# Patient Record
Sex: FEMALE | Race: WHITE
[De-identification: names, ages, dates, MRNs, and addresses within clinical notes are randomized per-mention and may not be internally consistent; named-entity substitution may affect disease eponyms.]

---

## 2020-04-16 LAB
ALBUMIN SERPL-MCNC: 3.3 G/DL (ref 3.5–5)
ALBUMIN/GLOB SERPL: 0.9 {RATIO} (ref 0.8–2)
ALP SERPL-CCNC: 100 IU/L (ref 40–150)
ALT SERPL-CCNC: 12 IU/L (ref 0–55)
ANION GAP SERPL CALC-SCNC: 9.8 MMOL/L (ref 8–16)
BASOPHILS # BLD AUTO: 0 10*3/UL (ref 0–0.1)
BASOPHILS NFR BLD AUTO: 0.6 % (ref 0–1)
BUN SERPL-MCNC: 20 MG/DL (ref 7–26)
BUN/CREAT SERPL: 23 (ref 6–25)
CALCIUM SERPL-MCNC: 9.2 MG/DL (ref 8.4–10.2)
CHLORIDE SERPL-SCNC: 108 MMOL/L (ref 98–107)
CO2 SERPL-SCNC: 30 MMOL/L (ref 22–29)
DEPRECATED NEUTROPHILS # BLD AUTO: 4 10*3/UL (ref 2.1–6.9)
EGFRCR SERPLBLD CKD-EPI 2021: > 60 ML/MIN (ref 60–?)
EOSINOPHIL # BLD AUTO: 0.5 10*3/UL (ref 0–0.4)
EOSINOPHIL NFR BLD AUTO: 7 % (ref 0–6)
ERYTHROCYTE [DISTWIDTH] IN CORD BLOOD: 15.4 % (ref 11.7–14.4)
GLOBULIN PLAS-MCNC: 3.8 G/DL (ref 2.3–3.5)
GLUCOSE SERPLBLD-MCNC: 113 MG/DL (ref 74–118)
HCT VFR BLD AUTO: 39.4 % (ref 34.2–44.1)
HGB BLD-MCNC: 12.4 G/DL (ref 12–16)
LYMPHOCYTES # BLD: 1.3 10*3/UL (ref 1–3.2)
LYMPHOCYTES NFR BLD AUTO: 20.6 % (ref 18–39.1)
MCH RBC QN AUTO: 29.1 PG (ref 28–32)
MCHC RBC AUTO-ENTMCNC: 31.5 G/DL (ref 31–35)
MCV RBC AUTO: 92.5 FL (ref 81–99)
MONOCYTES # BLD AUTO: 0.6 10*3/UL (ref 0.2–0.8)
MONOCYTES NFR BLD AUTO: 9.3 % (ref 4.4–11.3)
NEUTS SEG NFR BLD AUTO: 62.3 % (ref 38.7–80)
PLATELET # BLD AUTO: 179 X10E3/UL (ref 140–360)
POTASSIUM SERPL-SCNC: 3.8 MMOL/L (ref 3.5–5.1)
RBC # BLD AUTO: 4.26 X10E6/UL (ref 3.6–5.1)
SODIUM SERPL-SCNC: 144 MMOL/L (ref 136–145)

## 2020-04-21 ENCOUNTER — HOSPITAL ENCOUNTER (OUTPATIENT)
Dept: HOSPITAL 88 - CATH LAB | Age: 60
Discharge: HOME | End: 2020-04-21
Attending: INTERNAL MEDICINE
Payer: COMMERCIAL

## 2020-04-21 VITALS — SYSTOLIC BLOOD PRESSURE: 144 MMHG | DIASTOLIC BLOOD PRESSURE: 72 MMHG

## 2020-04-21 VITALS — BODY MASS INDEX: 51.91 KG/M2 | WEIGHT: 293 LBS | HEIGHT: 63 IN

## 2020-04-21 VITALS — DIASTOLIC BLOOD PRESSURE: 71 MMHG | SYSTOLIC BLOOD PRESSURE: 118 MMHG

## 2020-04-21 VITALS — DIASTOLIC BLOOD PRESSURE: 66 MMHG | SYSTOLIC BLOOD PRESSURE: 125 MMHG

## 2020-04-21 VITALS — SYSTOLIC BLOOD PRESSURE: 128 MMHG | DIASTOLIC BLOOD PRESSURE: 86 MMHG

## 2020-04-21 VITALS — DIASTOLIC BLOOD PRESSURE: 64 MMHG | SYSTOLIC BLOOD PRESSURE: 122 MMHG

## 2020-04-21 VITALS — SYSTOLIC BLOOD PRESSURE: 125 MMHG | DIASTOLIC BLOOD PRESSURE: 88 MMHG

## 2020-04-21 VITALS — SYSTOLIC BLOOD PRESSURE: 140 MMHG | DIASTOLIC BLOOD PRESSURE: 70 MMHG

## 2020-04-21 VITALS — SYSTOLIC BLOOD PRESSURE: 113 MMHG | DIASTOLIC BLOOD PRESSURE: 71 MMHG

## 2020-04-21 DIAGNOSIS — L97.929: ICD-10-CM

## 2020-04-21 DIAGNOSIS — I10: ICD-10-CM

## 2020-04-21 DIAGNOSIS — R00.0: ICD-10-CM

## 2020-04-21 DIAGNOSIS — I87.2: ICD-10-CM

## 2020-04-21 DIAGNOSIS — I70.249: Primary | ICD-10-CM

## 2020-04-21 DIAGNOSIS — I48.0: ICD-10-CM

## 2020-04-21 DIAGNOSIS — Z79.02: ICD-10-CM

## 2020-04-21 DIAGNOSIS — Z01.812: ICD-10-CM

## 2020-04-21 PROCEDURE — 36415 COLL VENOUS BLD VENIPUNCTURE: CPT

## 2020-04-21 PROCEDURE — 99153 MOD SED SAME PHYS/QHP EA: CPT

## 2020-04-21 PROCEDURE — 99152 MOD SED SAME PHYS/QHP 5/>YRS: CPT

## 2020-04-21 PROCEDURE — 76937 US GUIDE VASCULAR ACCESS: CPT

## 2020-04-21 PROCEDURE — 80053 COMPREHEN METABOLIC PANEL: CPT

## 2020-04-21 PROCEDURE — 75710 ARTERY X-RAYS ARM/LEG: CPT

## 2020-04-21 PROCEDURE — 75625 CONTRAST EXAM ABDOMINL AORTA: CPT

## 2020-04-21 PROCEDURE — 36247 INS CATH ABD/L-EXT ART 3RD: CPT

## 2020-04-21 PROCEDURE — 85025 COMPLETE CBC W/AUTO DIFF WBC: CPT

## 2020-04-21 NOTE — XMS REPORT
Patient Summary Document

                             Created on: 2020



YEIMY BERNSTEIN

External Reference #: [id number]

: 1960

Sex: Female



Demographics







                          Address                   [address] [place], [place] [address], [place]  [address]

 

                          Home Phone                [phone]

 

                          Preferred Language        Unknown

 

                          Marital Status            Unknown

 

                          Shinto Affiliation     Unknown

 

                          Race                      Unknown

 

                          Ethnic Group              Unknown





Author







                          Author                    [facility]

 

                          Address                   Unknown

 

                          Phone                     Unavailable







Care Team Providers







                    Care Team Member Name    Role                Phone

 

                          Unavailable               Unavailable







Problems

This patient has no known problems.



Allergies, Adverse Reactions, Alerts

This patient has no known allergies or adverse reactions.



Medications

This patient has no known medications.



Encounters







             Start Date/Time    End Date/Time    Encounter Type    Admission Type    Attending [facility] Facility       Care Department     Encounter ID

 

        2019 16:09:04            Outpatient                    [facility]    [id number]

 

        2019 10:35:45            Outpatient                    [facility]    [id number]

 

        2019-09-10 14:01:00    2019-09-10 14:01:00    Outpatient                    [facility]     [id number]

 

        2019 13:44:00    2019 13:44:00    Outpatient                    [facility]     [id number]

 

        2019 08:00:00    2019 08:00:00    Outpatient                    [facility]    [id number]

## 2020-04-21 NOTE — NUR
1409pReceived pt to room #9 ,Identiferx2.bedside report received from GABRIELLA Buckley. Alert 
oriented and appropriate, PERRLA, respirations even and unlabored to room air. Pulses x4 
extremities equal and strong. Pedal pulses PT/DP X2 palpable No access PTx2x2 Dressings 
remain in place. and marked. Cap fill brisk < 3 sec. Rt Tr band approach Diagnostic only. TR 
band ok down at 1500pm and dc home approx 1530pm.No gross issues pain,pallor,pressure or 
dysrhythmia. 



Skin warm and dry integrity appears  D/I. IV 20g to left hand,  presents healthy w/o s/s of 
infiltration or complaint. Abdomen soft and supple. pt offered toileting, denies need to 
urinate or defecate. No personal affects with patient.  Family mother at bedside calling for 
ride. Pt and family verbalizes understanding of POC. 



Currently w/o complaint of pain or need.ds/rn Electrodesiccation Text: The wound bed was treated with electrodesiccation after the biopsy was performed. Notification Instructions: Patient will be notified of biopsy results. However, patient instructed to call the office if not contacted within 2 weeks. Wound Care: Petrolatum Bill For Surgical Tray: no Additional Anesthesia Volume In Cc (Will Not Render If 0): 0 Anesthesia Type: 1% lidocaine with epinephrine Consent: Written consent was obtained from POA and risks were reviewed including but not limited to scarring, infection, bleeding, scabbing, incomplete removal, nerve damage and allergy to anesthesia. Type Of Destruction Used: Curettage Electrodesiccation And Curettage Text: The wound bed was treated with electrodesiccation and curettage after the biopsy was performed. Detail Level: Detailed Dressing: bandage Silver Nitrate Text: The wound bed was treated with silver nitrate after the biopsy was performed. Biopsy Method: Dermablade Hemostasis: Drysol Was A Bandage Applied: Yes Curettage Text: The wound bed was treated with curettage after the biopsy was performed. Billing Type: Third-Party Bill Lab: 6 Anesthesia Volume In Cc (Will Not Render If 0): 0.2 Post-Care Instructions: I reviewed with the patient in detail post-care instructions. Patient is to keep the biopsy site dry overnight, and then apply bacitracin twice daily until healed. Patient may apply hydrogen peroxide soaks to remove any crusting. Cryotherapy Text: The wound bed was treated with cryotherapy after the biopsy was performed. Lab Facility: 3 Biopsy Type: H and E Depth Of Biopsy: dermis

## 2020-04-21 NOTE — NUR
1500p RADIAL Compression removal:     Initial  Cuff  volume    12  cc

            1500p  -2cc Removed  No hematoma/bleeding noted with normal  neurovascular 
function.



            1515p  -5 cc Removed  No hematoma/ bleeding noted with normal  neurovascular 
function.



            1530cc -5cc Removed  No hematoma/bleeding noted with normal  neurovascular 
function.



              c



Air removal completed. 

Stasis achieved sterile 2x2,Tegaderm,  Coban  dressing  No hematoma, bleeding noted with 
normal neurovascular function. Wrist splint in place. Pt instructed on POC.

Ds/Rn

## 2020-04-21 NOTE — NUR
1545p Pt meets DC criteria. Rt Tr band assessed for s/s of complication and presence of 
hematoma. Skin warm, dry, no discolor, and pulses present DP only Po tib unable to access 
due to wraps in place. IV removed from left hand. Distal tip appears intact. VS WNL. Pt 
denies pain, sob, or need at this time. Family at curbside. Review of discharge paperwork 
and follow up instructions. verbalized understanding. Pt to wheelchair and transported to 
front of hospital. Transferred to private vehicle under own strength w/o incident with DC 
paperwork in hand. - ds/rn

## 2020-04-21 NOTE — OPERATIVE REPORT
DATE OF PROCEDURE:  04/21/2020

 

SURGEON:  Hussein Davis MD

 

INDICATION:  Peripheral arterial disease, ulceration and claudication of left lower

extremity with critical limb ischemia, Mott 6. 

 

PROCEDURES PERFORMED:  

1. Ultrasound-guided access in the right radial artery with sheath placement and image

storage. 

2. Conscious sedation and administration.  Hemodynamic and neurological monitoring and

recovery by cath lab RN and supervision by MD for 35 minutes. 

3. Abdominal aorta catheter placement abdominal aortogram.

4. Third order catheter placed in the right radial artery to the left femoral artery

with unilateral extremity angiogram. 

5. Deployment of right wrist TR band.

 

COMPLICATIONS:  None.

 

RECOMMENDATIONS:  Medical therapy assessment for deep and superficial venous disease.

 

DESCRIPTION OF PROCEDURE:  Access obtained in the right radial artery using ultrasound

guidance.  A 6-Danish sheath was placed.  Abdominal aortogram demonstrated widely patent

abdominal aorta and iliacs bilaterally.  Catheter was then extended from the right

radial artery to the left femoral artery.  No evidence of angiographic peripheral

arterial disease.  A 20% to 30% stenosis of the proximal left anterior tibial artery

with three-vessel runoff to the foot.  No 

intervention deemed necessary.  Wire and guide were removed.  TR band applied.  The

patient discharged to home same day. 

 

 

 

 

______________________________

Hussein Davis MD

 

KSB/MODL

D:  04/21/2020 14:01:32

T:  04/21/2020 20:00:45

Job #:  [id number]